# Patient Record
Sex: FEMALE | Race: BLACK OR AFRICAN AMERICAN | Employment: FULL TIME | URBAN - METROPOLITAN AREA
[De-identification: names, ages, dates, MRNs, and addresses within clinical notes are randomized per-mention and may not be internally consistent; named-entity substitution may affect disease eponyms.]

---

## 2020-10-22 ENCOUNTER — HOSPITAL ENCOUNTER (EMERGENCY)
Age: 35
Discharge: HOME | End: 2020-10-22
Attending: EMERGENCY MEDICINE
Payer: COMMERCIAL

## 2020-10-22 VITALS
DIASTOLIC BLOOD PRESSURE: 84 MMHG | HEIGHT: 64 IN | HEART RATE: 92 BPM | WEIGHT: 210 LBS | OXYGEN SATURATION: 99 % | RESPIRATION RATE: 14 BRPM | BODY MASS INDEX: 35.85 KG/M2 | TEMPERATURE: 97.1 F | SYSTOLIC BLOOD PRESSURE: 115 MMHG

## 2020-10-22 PROCEDURE — 4500000027

## 2020-10-22 PROCEDURE — 99282 EMERGENCY DEPT VISIT SF MDM: CPT

## 2020-10-22 PROCEDURE — 2500000003 HC RX 250 WO HCPCS: Performed by: EMERGENCY MEDICINE

## 2020-10-22 RX ORDER — LIDOCAINE HYDROCHLORIDE 10 MG/ML
5 INJECTION, SOLUTION EPIDURAL; INFILTRATION; INTRACAUDAL; PERINEURAL ONCE
Status: COMPLETED | OUTPATIENT
Start: 2020-10-22 | End: 2020-10-22

## 2020-10-22 RX ADMIN — LIDOCAINE HYDROCHLORIDE 5 ML: 10 INJECTION, SOLUTION EPIDURAL; INFILTRATION; INTRACAUDAL; PERINEURAL at 15:07

## 2020-10-22 ASSESSMENT — PAIN DESCRIPTION - ORIENTATION: ORIENTATION: RIGHT

## 2020-10-22 ASSESSMENT — PAIN SCALES - GENERAL: PAINLEVEL_OUTOF10: 3

## 2020-10-22 ASSESSMENT — PAIN DESCRIPTION - LOCATION: LOCATION: ARM

## 2020-10-22 ASSESSMENT — PAIN DESCRIPTION - PAIN TYPE: TYPE: ACUTE PAIN

## 2020-10-22 NOTE — ED PROVIDER NOTES
[address] 58  Chief Complaint   Patient presents with    Laceration     rt forearm aprox 5cm     HISTORY OF PRESENT ILLNESS  Cameron [place] is a 30 y.o. female who presents to the ED complaining of right forearm laceration, she accidentally scraped it against scissors. This is not Worker's Comp. per patient report. She says bleeding was easily controlled with pressure and she arrives with a bandage in place. 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 She denies any distal numbness tingling or weakness. She denies any other injuries. She says the wound she thinks needs stitches but otherwise is causing her minimal discomfort. Injury was just prior to arrival.  Tetanus is up-to-date. No other complaints, modifying factors or associated symptoms. Nursing notes reviewed. Past Medical History:   Diagnosis Date    Fibromyalgia      History reviewed. No pertinent surgical history. History reviewed. No pertinent family history.   Social History     Socioeconomic History    Marital status: Single     Spouse name: Not on file    Number of children: Not on file    Years of education: Not on file    Highest education level: Not on file   Occupational History    Not on file   Social Needs    Financial resource strain: Not on file    Food insecurity     Worry: Not on file     Inability: Not on file    Transportation needs     Medical: Not on file     Non-medical: Not on file   Tobacco Use    Smoking status: Current Some Day Smoker   Substance and Sexual Activity    Alcohol use: Yes     Comment: occ    Drug use: Yes     Types: Marijuana     Comment: medical    Sexual activity: Not on file   Lifestyle    Physical activity     Days per week: Not on file     Minutes per session: Not on file    Stress: Not on file   Relationships    Social connections     Talks on phone: Not on file     Gets together: Not on file     Attends Restorationism service: Not on file     Active member of club or organization: Not on file     Attends meetings of clubs or organizations: Not on file     Relationship status: Not on file    Intimate partner violence     Fear of current or ex partner: Not on file     Emotionally abused: Not on file     Physically abused: Not on file     Forced sexual activity: Not on file   Other Topics Concern    Not on file   Social History Narrative    Not on file     Current Facility-Administered Medications   Medication Dose Route Frequency Provider Last Rate Last Dose    lidocaine PF 1 % injection 5 mL  5 mL Intradermal Once Tyler MD Julissa         No current outpatient medications on file. No Known Allergies    REVIEW OF SYSTEMS  6 systems reviewed, pertinent positives per HPI otherwise noted to be negative    PHYSICAL EXAM   /84   Pulse 92   Temp 97.1 °F (36.2 °C) (Temporal)   Resp 14   Ht 5' 4\" (1.626 m)   Wt 210 lb (95.3 kg)   LMP 09/22/2020 (Approximate)   SpO2 99%   BMI 36.05 kg/m²    GENERAL APPEARANCE: Awake and alert. Cooperative. No acute distress. HEAD: Normocephalic. Atraumatic. EYES: PERRL. EOM's grossly intact. ENT: Mucous membranes are moist.   NECK: Supple. Normal ROM. CHEST: Equal symmetric chest rise. RRR  LUNGS: Breathing is unlabored. Speaking comfortably in full sentences. CTAB  ABDOMEN: Nondistended, nontender  SKIN: Warm and dry. No acute rashes. NEUROLOGICAL: Alert and oriented. Strength is 5/5 in all extremities and sensation is intact. MUSCULOSKELETAL:  LUE:  No tenderness. 2+ radial pulse. Brisk cap refill x5 digits. Sensation and motor function fully intact in the radial, ulnar, and median nerve distribution. Full range of motion of all major joints. Cardinal movements of hand fully intact. No erythema, bruising, or lacerations. Comparments are soft.   RUE: 4cm superficial laceration as depicted below to the mid right forearm, no bleeding, no tendon or muscle exposure, minimal tenderness at the laceration site, no other right upper extremity tenderness. 2+ radial pulse. Brisk cap refill x5 digits. Sensation and motor function fully intact in the radial, ulnar, and median nerve distribution. Full range of motion of all major joints. Cardinal movements of hand fully intact. No erythema, bruising, or other lacerations. Comparments are soft. ED COURSE/MDM  The patient's ED course was notable for uncomplicated right forearm laceration. Cleaned and closed per the procedure note below. Sutures out in 7-10 days. No indication for XR. No indication for prophylactic abx but si/sx of infection and wound care at home discussed. No signs of neurovascular compromise, tet is UTD, no other injuries noted. Not worker's comp per patient. Laceration Repair Procedure Note    Indication: Laceration(s)    Consent: The patient was counseled regarding the procedure, it's indications, risks, potential complications and alternatives and any questions were answered. Consent was obtained. Location: R forearm    Shape: linear    Procedure: The patient was placed in the appropriate position and anesthesia around the laceration was obtained by infiltration using 1% Lidocaine without epinephrine. The area was then cleaned with betadine and draped in a sterile fashion. The wound was then irrigated with normal saline. The wound was fully explored in a bloodless field and no foreign bodies were found. The laceration was closed with 4-0 Ethilon using interrupted sutures. There were no additional lacerations requiring repair. .    The wound area was then dressed with gauze    Total repaired wound length: 4 cm. For reference, laceration length cut-offs for billing purposes are  by lengths of 0-2.5cm, 2.6-5cm, 5.1-7.5cm, and 7.6-12.5cm. Wound classification:  simple    Suture/staple count: total suture count 5    The patient tolerated the procedure well.     The patients tetanus status was up to date and did not require a booster dose. Complications: None        CLINICAL IMPRESSION  1. Forearm laceration, right, initial encounter        Blood pressure 115/84, pulse 92, temperature 97.1 °F (36.2 °C), temperature source Temporal, resp. rate 14, height 5' 4\" (1.626 m), weight 210 lb (95.3 kg), last menstrual period 09/22/2020, SpO2 99 %. DISPOSITION    I have discussed the findings of today's workup with the patient and addressed the patient's questions and concerns. Important warning signs as well as new or worsening symptoms which would necessitate immediate return to the ED were discussed. The plan is to discharge from the ED at this time, and the patient is in stable condition. The patient acknowledged understanding is agreeable with this plan. Follow-up with:  Edith Avery MD  [address] [facility]  [phone]    Schedule an appointment as soon as possible for a visit in 1 week  For wound re-check, For suture removal    [facility] [id number] [facility]  [phone]  Go to   If symptoms worsen      This chart was created using Dragon dictation software. Efforts were made by me to ensure accuracy, however some errors may be present due to limitations of this technology.         Joseph Peña MD  10/22/20 [id number]